# Patient Record
Sex: FEMALE | Race: WHITE | NOT HISPANIC OR LATINO | Employment: OTHER | URBAN - METROPOLITAN AREA
[De-identification: names, ages, dates, MRNs, and addresses within clinical notes are randomized per-mention and may not be internally consistent; named-entity substitution may affect disease eponyms.]

---

## 2017-10-04 ENCOUNTER — TELEPHONE (OUTPATIENT)
Dept: FAMILY MEDICINE | Facility: CLINIC | Age: 47
End: 2017-10-04

## 2017-10-04 NOTE — TELEPHONE ENCOUNTER
Panel Management Review      Patient has the following on her problem list: None      Composite cancer screening  Chart review shows that this patient is due/due soon for the following Pap Smear and Mammogram  Summary:    Patient is due/failing the following:   MAMMOGRAM and PAP    Action needed:   Patient needs office visit for annual physical and mammogram.    Type of outreach:    Sent letter.    Questions for provider review:    None                                                                                                                                    Kaitlynn Gaytan CMA ([facility])       Chart routed to.

## 2017-10-04 NOTE — LETTER
October 4, 2017      Carly Beckwith  [address] [place] [address]          Cintia Pacheco NP has been reviewing your chart.  It appears that there are aspects of your care that could be improved.  At this time you are due for an Annual Physical and Mammogram.  If you could plan to do that in the near future it would be very helpful.  We are trying to help our patients achieve their health care goals.      If you have any questions and to schedule this appointment please contact the clinic at [phone].       Thank you,            Sincerely,        Cintia Pacheco NP

## 2017-10-22 ENCOUNTER — HEALTH MAINTENANCE LETTER (OUTPATIENT)
Age: 47
End: 2017-10-22

## 2017-10-26 DIAGNOSIS — F32.89 POSTMENOPAUSAL DEPRESSION: ICD-10-CM

## 2017-10-26 DIAGNOSIS — F32.1 MAJOR DEPRESSIVE DISORDER, SINGLE EPISODE, MODERATE (H): ICD-10-CM

## 2017-10-31 RX ORDER — CITALOPRAM HYDROBROMIDE 20 MG/1
TABLET ORAL
Qty: 30 TABLET | Refills: 0 | Status: SHIPPED | OUTPATIENT
Start: 2017-10-31 | End: 2017-11-28

## 2017-10-31 RX ORDER — BUPROPION HYDROCHLORIDE 150 MG/1
TABLET ORAL
Qty: 30 TABLET | Refills: 0 | Status: SHIPPED | OUTPATIENT
Start: 2017-10-31 | End: 2017-11-28

## 2017-11-21 ENCOUNTER — HOSPITAL ENCOUNTER (OUTPATIENT)
Dept: MAMMOGRAPHY | Facility: CLINIC | Age: 47
Discharge: HOME | End: 2017-11-21
Attending: NURSE PRACTITIONER | Admitting: NURSE PRACTITIONER
Payer: COMMERCIAL

## 2017-11-21 DIAGNOSIS — Z12.31 ENCOUNTER FOR SCREENING MAMMOGRAM FOR BREAST CANCER: ICD-10-CM

## 2017-11-21 PROCEDURE — G0202 SCR MAMMO BI INCL CAD: HCPCS

## 2017-11-28 ENCOUNTER — OFFICE VISIT (OUTPATIENT)
Dept: FAMILY MEDICINE | Facility: CLINIC | Age: 47
End: 2017-11-28
Payer: COMMERCIAL

## 2017-11-28 VITALS
TEMPERATURE: 97.7 F | HEIGHT: 66 IN | WEIGHT: 177 LBS | HEART RATE: 75 BPM | BODY MASS INDEX: 28.45 KG/M2 | SYSTOLIC BLOOD PRESSURE: 109 MMHG | DIASTOLIC BLOOD PRESSURE: 63 MMHG

## 2017-11-28 DIAGNOSIS — N95.2 VAGINAL ATROPHY: ICD-10-CM

## 2017-11-28 DIAGNOSIS — Z00.00 ENCOUNTER FOR ROUTINE ADULT HEALTH EXAMINATION WITHOUT ABNORMAL FINDINGS: Primary | ICD-10-CM

## 2017-11-28 DIAGNOSIS — Z13.6 CARDIOVASCULAR SCREENING; LDL GOAL LESS THAN 160: ICD-10-CM

## 2017-11-28 DIAGNOSIS — F32.89 POSTMENOPAUSAL DEPRESSION: ICD-10-CM

## 2017-11-28 DIAGNOSIS — Z12.4 SCREENING FOR MALIGNANT NEOPLASM OF CERVIX: ICD-10-CM

## 2017-11-28 DIAGNOSIS — F32.1 MAJOR DEPRESSIVE DISORDER, SINGLE EPISODE, MODERATE (H): ICD-10-CM

## 2017-11-28 DIAGNOSIS — Z23 NEED FOR PROPHYLACTIC VACCINATION AND INOCULATION AGAINST INFLUENZA: ICD-10-CM

## 2017-11-28 LAB
CHOLEST SERPL-MCNC: 245 MG/DL
GLUCOSE SERPL-MCNC: 72 MG/DL (ref 70–99)
HDLC SERPL-MCNC: 92 MG/DL
LDLC SERPL CALC-MCNC: 142 MG/DL
NONHDLC SERPL-MCNC: 153 MG/DL
TRIGL SERPL-MCNC: 53 MG/DL

## 2017-11-28 PROCEDURE — 87624 HPV HI-RISK TYP POOLED RSLT: CPT | Performed by: NURSE PRACTITIONER

## 2017-11-28 PROCEDURE — 36415 COLL VENOUS BLD VENIPUNCTURE: CPT | Performed by: NURSE PRACTITIONER

## 2017-11-28 PROCEDURE — 90686 IIV4 VACC NO PRSV 0.5 ML IM: CPT | Performed by: NURSE PRACTITIONER

## 2017-11-28 PROCEDURE — 99396 PREV VISIT EST AGE 40-64: CPT | Performed by: NURSE PRACTITIONER

## 2017-11-28 PROCEDURE — 90471 IMMUNIZATION ADMIN: CPT | Performed by: NURSE PRACTITIONER

## 2017-11-28 PROCEDURE — 80061 LIPID PANEL: CPT | Performed by: NURSE PRACTITIONER

## 2017-11-28 PROCEDURE — G0145 SCR C/V CYTO,THINLAYER,RESCR: HCPCS | Performed by: NURSE PRACTITIONER

## 2017-11-28 PROCEDURE — 82947 ASSAY GLUCOSE BLOOD QUANT: CPT | Performed by: NURSE PRACTITIONER

## 2017-11-28 RX ORDER — BUPROPION HYDROCHLORIDE 150 MG/1
150 TABLET ORAL EVERY MORNING
Qty: 90 TABLET | Refills: 3 | Status: SHIPPED | OUTPATIENT
Start: 2017-11-28 | End: 2018-12-21

## 2017-11-28 RX ORDER — CITALOPRAM HYDROBROMIDE 20 MG/1
20 TABLET ORAL DAILY
Qty: 90 TABLET | Refills: 3 | Status: SHIPPED | OUTPATIENT
Start: 2017-11-28 | End: 2018-12-21

## 2017-11-28 ASSESSMENT — ANXIETY QUESTIONNAIRES
IF YOU CHECKED OFF ANY PROBLEMS ON THIS QUESTIONNAIRE, HOW DIFFICULT HAVE THESE PROBLEMS MADE IT FOR YOU TO DO YOUR WORK, TAKE CARE OF THINGS AT HOME, OR GET ALONG WITH OTHER PEOPLE: NOT DIFFICULT AT ALL
2. NOT BEING ABLE TO STOP OR CONTROL WORRYING: NOT AT ALL
7. FEELING AFRAID AS IF SOMETHING AWFUL MIGHT HAPPEN: NOT AT ALL
GAD7 TOTAL SCORE: 0
1. FEELING NERVOUS, ANXIOUS, OR ON EDGE: NOT AT ALL
3. WORRYING TOO MUCH ABOUT DIFFERENT THINGS: NOT AT ALL
6. BECOMING EASILY ANNOYED OR IRRITABLE: NOT AT ALL
5. BEING SO RESTLESS THAT IT IS HARD TO SIT STILL: NOT AT ALL

## 2017-11-28 ASSESSMENT — PATIENT HEALTH QUESTIONNAIRE - PHQ9
5. POOR APPETITE OR OVEREATING: NOT AT ALL
SUM OF ALL RESPONSES TO PHQ QUESTIONS 1-9: 3

## 2017-11-28 NOTE — PATIENT INSTRUCTIONS
Preventive Health Recommendations  Female Ages 40 to 49    Yearly exam:     See your health care provider every year in order to  1. Review health changes.   2. Discuss preventive care.    3. Review your medicines if your doctor prescribed any.      Get a Pap test every three years (unless you have an abnormal result and your provider advises testing more often).      If you get Pap tests with HPV test, you only need to test every 5 years, unless you have an abnormal result. You do not need a Pap test if your uterus was removed (hysterectomy) and you have not had cancer.      You should be tested each year for STDs (sexually transmitted diseases), if you're at risk.       Ask your doctor if you should have a mammogram.      Have a colonoscopy (test for colon cancer) if someone in your family has had colon cancer or polyps before age 50.       Have a cholesterol test every 5 years.       Have a diabetes test (fasting glucose) after age 45. If you are at risk for diabetes, you should have this test every 3 years.    Shots: Get a flu shot each year. Get a tetanus shot every 10 years.     Nutrition:     Eat at least 5 servings of fruits and vegetables each day.    Eat whole-grain bread, whole-wheat pasta and brown rice instead of white grains and rice.    Talk to your provider about Calcium and Vitamin D.     Lifestyle    Exercise at least 150 minutes a week (an average of 30 minutes a day, 5 days a week). This will help you control your weight and prevent disease.    Limit alcohol to one drink per day.    No smoking.     Wear sunscreen to prevent skin cancer.    See your dentist every six months for an exam and cleaning.    Re-ordered Premarin cream - 1.5 gm three times weekly for the first month then reduce down to 1 gm 2-3 X weekly for the next 4-5 months, then weekly after that.        AMBROCIO Reynolds

## 2017-11-28 NOTE — MR AVS SNAPSHOT
After Visit Summary   11/28/2017    Carly Beckwith    MRN: [id number]           Patient Information     Date Of Birth          1970        Visit Information        Provider Department      11/28/2017 10:20 AM Cintia Pacheco NP [facility]        Today's Diagnoses     Encounter for routine adult health examination without abnormal findings    -  1    Major depressive disorder, single episode, moderate (H)        Postmenopausal depression        Screening for malignant neoplasm of cervix        CARDIOVASCULAR SCREENING; LDL GOAL LESS THAN 160        Vaginal atrophy          Care Instructions      Preventive Health Recommendations  Female Ages 40 to 49    Yearly exam:     See your health care provider every year in order to  1. Review health changes.   2. Discuss preventive care.    3. Review your medicines if your doctor prescribed any.      Get a Pap test every three years (unless you have an abnormal result and your provider advises testing more often).      If you get Pap tests with HPV test, you only need to test every 5 years, unless you have an abnormal result. You do not need a Pap test if your uterus was removed (hysterectomy) and you have not had cancer.      You should be tested each year for STDs (sexually transmitted diseases), if you're at risk.       Ask your doctor if you should have a mammogram.      Have a colonoscopy (test for colon cancer) if someone in your family has had colon cancer or polyps before age 50.       Have a cholesterol test every 5 years.       Have a diabetes test (fasting glucose) after age 45. If you are at risk for diabetes, you should have this test every 3 years.    Shots: Get a flu shot each year. Get a tetanus shot every 10 years.     Nutrition:     Eat at least 5 servings of fruits and vegetables each day.    Eat whole-grain bread, whole-wheat pasta and brown rice instead of white grains and rice.    Talk to your provider about Calcium  "and Vitamin D.     Lifestyle    Exercise at least 150 minutes a week (an average of 30 minutes a day, 5 days a week). This will help you control your weight and prevent disease.    Limit alcohol to one drink per day.    No smoking.     Wear sunscreen to prevent skin cancer.    See your dentist every six months for an exam and cleaning.    Re-ordered Premarin cream - 1.5 gm three times weekly for the first month then reduce down to 1 gm 2-3 X weekly for the next 4-5 months, then weekly after that.        AMBROCIO Reynolds            Follow-ups after your visit        Who to contact     If you have questions or need follow up information about today's clinic visit or your schedule please contact [facility] directly at [phone].  Normal or non-critical lab and imaging results will be communicated to you by MyChart, letter or phone within 4 business days after the clinic has received the results. If you do not hear from us within 7 days, please contact the clinic through MyChart or phone. If you have a critical or abnormal lab result, we will notify you by phone as soon as possible.  Submit refill requests through [employer] or call your pharmacy and they will forward the refill request to us. Please allow 3 business days for your refill to be completed.          Additional Information About Your Visit        [employer] Information     [employer] lets you send messages to your doctor, view your test results, renew your prescriptions, schedule appointments and more. To sign up, go to [email or web address] . Click on \"Log in\" on the left side of the screen, which will take you to the Welcome page. Then click on \"Sign up Now\" on the right side of the page.     You will be asked to enter the access code listed below, as well as some personal information. Please follow the directions to create your username and password.     Your access code is: [email or web address]  Expires: 2/26/2018 11:07 AM     Your access code " "will  in 90 days. If you need help or a new code, please call your [facility] clinic or [phone].        Care EveryWhere ID     This is your Care EveryWhere ID. This could be used by other organizations to access your [facility] medical records  [id number]        Your Vitals Were     Pulse Temperature Height Last Period Breastfeeding? BMI (Body Mass Index)    75 97.7  F (36.5  C) (Tympanic) 5' 6.25\" (1.683 m) 2011 No 28.35 kg/m2       Blood Pressure from Last 3 Encounters:   17 109/63   10/05/16 107/68   05/26/15 119/71    Weight from Last 3 Encounters:   17 177 lb (80.3 kg)   10/05/16 176 lb 14.4 oz (80.2 kg)   05/26/15 186 lb (84.4 kg)              We Performed the Following     DEPRESSION ACTION PLAN (DAP)     GLUCOSE     HPV High Risk Types DNA Cervical     Lipid panel reflex to direct LDL Non-fasting     Pap imaged thin layer screen with HPV - recommended age 30 - 65 years (select HPV order below)          Today's Medication Changes          These changes are accurate as of: 17 11:15 AM.  If you have any questions, ask your nurse or doctor.               Start taking these medicines.        Dose/Directions    conjugated estrogens cream   Commonly known as:  PREMARIN   Used for:  Postmenopausal depression, Vaginal atrophy   Started by:  Cintia Pacheco NP        Dose:  1.5 g   Start taking on:  2017   Place 1.5 g vaginally three times a week   Quantity:  30 g   Refills:  6         These medicines have changed or have updated prescriptions.        Dose/Directions    buPROPion 150 MG 24 hr tablet   Commonly known as:  WELLBUTRIN XL   This may have changed:  See the new instructions.   Used for:  Major depressive disorder, single episode, moderate (H)   Changed by:  Cintia Pacheco NP        Dose:  150 mg   Take 1 tablet (150 mg) by mouth every morning   Quantity:  90 tablet   Refills:  3       citalopram 20 MG tablet   Commonly known as:  celeXA   This may have " changed:  See the new instructions.   Used for:  Postmenopausal depression   Changed by:  Cintia Pacheco NP        Dose:  20 mg   Take 1 tablet (20 mg) by mouth daily   Quantity:  90 tablet   Refills:  3            Where to get your medicines      These medications were sent to [facility] [address] [place] [address] & [address] [address]    Hours:  24-hours Phone:  [phone]     buPROPion 150 MG 24 hr tablet    citalopram 20 MG tablet    conjugated estrogens cream                Primary Care Provider Office Phone # Fax #    Cintia Pacheco [phone] [address] [address]        Equal Access to Services     MILDRED CARBALLO : Hadii jony hudson hadasho Soomaali, waaxda luqadaha, qaybta kaalmada adeegyada, roseann mata . So [facility] [phone].    ATENCIÓN: Si habla español, tiene a dias disposición servicios gratuitos de asistencia lingüística. [facility] [phone].    We comply with applicable federal civil rights laws and [place] laws. We do not discriminate on the basis of race, color, national origin, age, disability, sex, sexual orientation, or gender identity.            Thank you!     Thank you for choosing [facility]  for your care. Our goal is always to provide you with excellent care. Hearing back from our patients is one way we can continue to improve our services. Please take a few minutes to complete the written survey that you may receive in the mail after your visit with us. Thank you!             Your Updated Medication List - Protect others around you: Learn how to safely use, store and throw away your medicines at www.disposemymeds.org.          This list is accurate as of: 11/28/17 11:15 AM.  Always use your most recent med list.                   Brand Name Dispense Instructions for use Diagnosis    buPROPion 150 MG 24 hr tablet     WELLBUTRIN XL    90 tablet    Take 1 tablet (150 mg) by mouth every morning    Major depressive disorder, single episode, moderate (H)       citalopram 20 MG tablet    celeXA    90 tablet    Take 1 tablet (20 mg) by mouth daily    Postmenopausal depression       conjugated estrogens cream   Start taking on:  11/29/2017    PREMARIN    30 g    Place 1.5 g vaginally three times a week    Postmenopausal depression, Vaginal atrophy       estradiol 0.0375 MG/24HR BIW patch    VIVELLE-DOT    8 patch    Place 1 patch onto the skin twice a week

## 2017-11-28 NOTE — NURSING NOTE
"Initial /63  Pulse 75  Temp 97.7  F (36.5  C) (Tympanic)  Ht 5' 6.25\" (1.683 m)  Wt 177 lb (80.3 kg)  LMP 06/21/2011  Breastfeeding? No  BMI 28.35 kg/m2 Estimated body mass index is 28.35 kg/(m^2) as calculated from the following:    Height as of this encounter: 5' 6.25\" (1.683 m).    Weight as of this encounter: 177 lb (80.3 kg). .    Kaitlynn Gaytan CMA ([facility])  "

## 2017-11-28 NOTE — LETTER
"[facility]  [address]  [facility] [address]  Phone: [phone]    November 28, 2017        Carly Beckwith  [address] [place] [address]          Dear Carly,      Cholesterol overall looks pretty good!     Your total cholesterol should be less than 180.     HDL is the \"good\" cholesterol and when it is high (over 60), it decreases the risk for heart attack and stroke. When HDL is less than 40, it raises the risk for these problems. You can raise the HDL by eating fish and by performing regular aerobic exercise (e.g. running, biking, swimming, aerobics).     LDL is the \"bad\" cholesterol linked to heart disease and stroke. For those who have heart disease or diabetes, their LDL should be less than 100. For all others, the LDL should be less than 130. You can lower this by following a low fat and low cholesterol diet.     Your triglycerides should be less than 160. You can lower these with a low fat diet, and for those with diabetes, by improving blood sugar control.     The ratio of your total cholesterol to good cholesterol (HDL) should be less than 5. Ratios above 5 have been associated with an increased risk of heart attack and stroke in the following 10 years. Raising your HDL cholesterol (see above) can dramatically improve this ratio.     Component      Latest Ref Rng & Units 11/28/2017   Cholesterol      <200 mg/dL 245 (H)   Triglycerides      <150 mg/dL 53   HDL Cholesterol      >49 mg/dL 92   LDL Cholesterol Calculated      <100 mg/dL 142 (H)   Non HDL Cholesterol      <130 mg/dL 153 (H)   Glucose      70 - 99 mg/dL 72       Sincerely,        Cintia Pacheco, AMBROCIO / logan cma        "
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
## 2017-11-28 NOTE — LETTER
December 6, 2017    Carly Beckwith  [address] [place] [address]    Dear Carly,  We are happy to inform you that your PAP smear result from 11/28/17 is normal.  We are now able to do a follow up test on PAP smears. The DNA test is for HPV (Human Papilloma Virus). Cervical cancer is closely linked with certain types of HPV. Your result showed no evidence of high risk HPV.  Therefore we recommend you return in 3 years for your next pap smear and HPV test.  You will still need to return to the clinic every year for an annual exam and other preventive tests.  Please contact the clinic at [phone] with any questions.  Sincerely,    Cintia Pacheco NP/olga

## 2017-11-28 NOTE — PROGRESS NOTES

## 2017-11-29 ASSESSMENT — ANXIETY QUESTIONNAIRES: GAD7 TOTAL SCORE: 0

## 2017-11-30 LAB
COPATH REPORT: NORMAL
PAP: NORMAL

## 2017-12-04 LAB
FINAL DIAGNOSIS: NORMAL
HPV HR 12 DNA CVX QL NAA+PROBE: NEGATIVE
HPV16 DNA SPEC QL NAA+PROBE: NEGATIVE
HPV18 DNA SPEC QL NAA+PROBE: NEGATIVE
SPECIMEN DESCRIPTION: NORMAL

## 2018-06-01 DIAGNOSIS — F32.1 MAJOR DEPRESSIVE DISORDER, SINGLE EPISODE, MODERATE (H): ICD-10-CM

## 2018-06-01 DIAGNOSIS — F32.89 POSTMENOPAUSAL DEPRESSION: ICD-10-CM

## 2018-06-01 NOTE — TELEPHONE ENCOUNTER
"Requested Prescriptions   Pending Prescriptions Disp Refills     citalopram (CELEXA) 20 MG tablet [Pharmacy Med Name: CITALOPRAM 20MG TABLETS]  Last Written Prescription Date:  11/28/17  Last Fill Quantity: 90,  # refills: 3   Last office visit: 11/28/2017 with prescribing provider:  11/28/17   Future Office Visit:     90 tablet 0     Sig: TAKE 1 TABLET(20 MG) BY MOUTH DAILY    SSRIs Protocol Failed    6/1/2018  1:51 PM       Failed - PHQ-9 score less than 5 in past 6 months    Please review last PHQ-9 score.   PHQ-9 SCORE 5/26/2015 10/5/2016 11/28/2017   Total Score 5 - -   Total Score - 3 3          Failed - Recent (6 mo) or future (30 days) visit within the authorizing provider's specialty    Patient had office visit in the last 6 months or has a visit in the next 30 days with authorizing provider or within the authorizing provider's specialty.  See \"Patient Info\" tab in inbasket, or \"Choose Columns\" in Meds & Orders section of the refill encounter.           Passed - Medication is Bupropion    If the medication is Bupropion (Wellbutrin), and the patient is taking for smoking cessation; OK to refill.         Passed - Patient is age 18 or older       Passed - No active pregnancy on record       Passed - No positive pregnancy test in last 12 months        buPROPion (WELLBUTRIN XL) 150 MG 24 hr tablet [Pharmacy Med Name: BUPROPION XL 150MG TABLETS (24 H)]  Last Written Prescription Date:  11/28/17  Last Fill Quantity: 90,  # refills: 3   Last office visit: 11/28/2017 with prescribing provider:  11/28/17   Future Office Visit:     90 tablet 0     Sig: TAKE 1 TABLET(150 MG) BY MOUTH EVERY MORNING    SSRIs Protocol Failed    6/1/2018  1:51 PM       Failed - PHQ-9 score less than 5 in past 6 months    Please review last PHQ-9 score.        Failed - Recent (6 mo) or future (30 days) visit within the authorizing provider's specialty    Patient had office visit in the last 6 months or has a visit in the next 30 days with " "authorizing provider or within the authorizing provider's specialty.  See \"Patient Info\" tab in inbasket, or \"Choose Columns\" in Meds & Orders section of the refill encounter.         Passed - Medication is Bupropion    If the medication is Bupropion (Wellbutrin), and the patient is taking for smoking cessation; OK to refill.       Passed - Patient is age 18 or older       Passed - No active pregnancy on record       Passed - No positive pregnancy test in last 12 months          "

## 2018-06-04 RX ORDER — BUPROPION HYDROCHLORIDE 150 MG/1
TABLET ORAL
Qty: 90 TABLET | Refills: 0 | OUTPATIENT
Start: 2018-06-04

## 2018-06-04 RX ORDER — CITALOPRAM HYDROBROMIDE 20 MG/1
TABLET ORAL
Qty: 90 TABLET | Refills: 0 | OUTPATIENT
Start: 2018-06-04

## 2018-12-21 DIAGNOSIS — F32.89 POSTMENOPAUSAL DEPRESSION: ICD-10-CM

## 2018-12-21 DIAGNOSIS — F32.1 MAJOR DEPRESSIVE DISORDER, SINGLE EPISODE, MODERATE (H): ICD-10-CM

## 2018-12-21 RX ORDER — BUPROPION HYDROCHLORIDE 150 MG/1
TABLET ORAL
Qty: 30 TABLET | Refills: 0 | Status: SHIPPED | OUTPATIENT
Start: 2018-12-21 | End: 2019-01-21

## 2018-12-21 RX ORDER — CITALOPRAM HYDROBROMIDE 20 MG/1
TABLET ORAL
Qty: 30 TABLET | Refills: 0 | Status: SHIPPED | OUTPATIENT
Start: 2018-12-21

## 2018-12-21 NOTE — TELEPHONE ENCOUNTER
"Requested Prescriptions   Pending Prescriptions Disp Refills     buPROPion (WELLBUTRIN XL) 150 MG 24 hr tablet [Pharmacy Med Name: BUPROPION XL 150MG TABLETS (24 H)] 150 tablet 0     Sig: TAKE ONE TABLET BY MOUTH EVERY MORNING    SSRIs Protocol Failed - 12/21/2018  2:33 PM       Failed - PHQ-9 score less than 5 in past 6 months    Please review last PHQ-9 score.          Failed - Recent (6 mo) or future (30 days) visit within the authorizing provider's specialty    Patient had office visit in the last 6 months or has a visit in the next 30 days with authorizing provider or within the authorizing provider's specialty.  See \"Patient Info\" tab in inbasket, or \"Choose Columns\" in Meds & Orders section of the refill encounter.           Passed - Medication is Bupropion    If the medication is Bupropion (Wellbutrin), and the patient is taking for smoking cessation; OK to refill.         Passed - Patient is age 18 or older       Passed - No active pregnancy on record       Passed - No positive pregnancy test in last 12 months        citalopram (CELEXA) 20 MG tablet [Pharmacy Med Name: CITALOPRAM 20MG TABLETS] 150 tablet 0     Sig: TAKE ONE TABLET BY MOUTH DAILY    SSRIs Protocol Failed - 12/21/2018  2:33 PM       Failed - PHQ-9 score less than 5 in past 6 months    Please review last PHQ-9 score.          Failed - Recent (6 mo) or future (30 days) visit within the authorizing provider's specialty    Patient had office visit in the last 6 months or has a visit in the next 30 days with authorizing provider or within the authorizing provider's specialty.  See \"Patient Info\" tab in inbasket, or \"Choose Columns\" in Meds & Orders section of the refill encounter.           Passed - Medication is Bupropion    If the medication is Bupropion (Wellbutrin), and the patient is taking for smoking cessation; OK to refill.         Passed - Patient is age 18 or older       Passed - No active pregnancy on record       Passed - No positive " pregnancy test in last 12 months

## 2018-12-21 NOTE — TELEPHONE ENCOUNTER
Routing refill request to provider for review/approval because:  Patient needs to be seen because it has been more than 1 year since last office visit.  She is due for depression recheck.  She is aware of this and will schedule appt.    Ok for 30 days?  Patient requesting refill today please.    Routing to provider.  Cathy MONTES RN

## 2019-01-21 ENCOUNTER — OFFICE VISIT (OUTPATIENT)
Dept: FAMILY MEDICINE | Facility: CLINIC | Age: 49
End: 2019-01-21
Payer: COMMERCIAL

## 2019-01-21 VITALS
TEMPERATURE: 98.3 F | BODY MASS INDEX: 28.77 KG/M2 | HEART RATE: 61 BPM | OXYGEN SATURATION: 99 % | WEIGHT: 179 LBS | DIASTOLIC BLOOD PRESSURE: 68 MMHG | SYSTOLIC BLOOD PRESSURE: 108 MMHG | HEIGHT: 66 IN

## 2019-01-21 DIAGNOSIS — F32.89 POSTMENOPAUSAL DEPRESSION: ICD-10-CM

## 2019-01-21 DIAGNOSIS — Z12.11 COLON CANCER SCREENING: ICD-10-CM

## 2019-01-21 DIAGNOSIS — F32.1 MAJOR DEPRESSIVE DISORDER, SINGLE EPISODE, MODERATE (H): Primary | ICD-10-CM

## 2019-01-21 PROCEDURE — 99213 OFFICE O/P EST LOW 20 MIN: CPT | Performed by: NURSE PRACTITIONER

## 2019-01-21 RX ORDER — CITALOPRAM HYDROBROMIDE 10 MG/1
10 TABLET ORAL DAILY
Qty: 30 TABLET | Refills: 0 | Status: SHIPPED | OUTPATIENT
Start: 2019-01-21 | End: 2019-03-01

## 2019-01-21 RX ORDER — CITALOPRAM HYDROBROMIDE 20 MG/1
20 TABLET ORAL DAILY
Qty: 30 TABLET | Refills: 0 | Status: CANCELLED | OUTPATIENT
Start: 2019-01-21

## 2019-01-21 RX ORDER — BUPROPION HYDROCHLORIDE 150 MG/1
150 TABLET ORAL EVERY MORNING
Qty: 90 TABLET | Refills: 3 | Status: SHIPPED | OUTPATIENT
Start: 2019-01-21

## 2019-01-21 ASSESSMENT — ANXIETY QUESTIONNAIRES
7. FEELING AFRAID AS IF SOMETHING AWFUL MIGHT HAPPEN: NOT AT ALL
5. BEING SO RESTLESS THAT IT IS HARD TO SIT STILL: SEVERAL DAYS
2. NOT BEING ABLE TO STOP OR CONTROL WORRYING: NOT AT ALL
6. BECOMING EASILY ANNOYED OR IRRITABLE: SEVERAL DAYS
IF YOU CHECKED OFF ANY PROBLEMS ON THIS QUESTIONNAIRE, HOW DIFFICULT HAVE THESE PROBLEMS MADE IT FOR YOU TO DO YOUR WORK, TAKE CARE OF THINGS AT HOME, OR GET ALONG WITH OTHER PEOPLE: NOT DIFFICULT AT ALL
GAD7 TOTAL SCORE: 2
3. WORRYING TOO MUCH ABOUT DIFFERENT THINGS: NOT AT ALL
1. FEELING NERVOUS, ANXIOUS, OR ON EDGE: NOT AT ALL

## 2019-01-21 ASSESSMENT — PATIENT HEALTH QUESTIONNAIRE - PHQ9
SUM OF ALL RESPONSES TO PHQ QUESTIONS 1-9: 4
5. POOR APPETITE OR OVEREATING: NOT AT ALL

## 2019-01-21 ASSESSMENT — MIFFLIN-ST. JEOR: SCORE: 1452.66

## 2019-01-21 NOTE — PATIENT INSTRUCTIONS
Continue Wellbutrin 150 mg once daily.  Decrease citalopram to 10 mg once daily for one month.  If doing well in one month, we can wean off the citalopram.        Thank you for choosing [facility].  You may be receiving a survey in the mail from [employer] regarding your visit today.  Please take a few minutes to complete and return the survey to let us know how we are doing.      If you have questions or concerns, please contact us via [employer] or you can contact your care team at [phone].    Our Clinic hours are:  Monday 6:40 am  to 7:00 pm  Tuesday -Friday 6:40 am to 5:00 pm    The [place] outpatient lab hours are:  Monday - Friday 6:10 am to 4:45 pm  Saturdays 7:00 am to 11:00 am  Appointments are required, call [phone]    If you have clinical questions after hours or would like to schedule an appointment,  call the clinic at [phone].

## 2019-01-21 NOTE — PROGRESS NOTES
"  SUBJECTIVE:   Carly Beckwith is a 48 year old female who presents to clinic today for the following health issues:      Depression Followup    Status since last visit: Stable     Thinks she is in a good spot    Would like to wean off these medications    See PHQ-9 for current symptoms.  Other associated symptoms: None    Complicating factors:   Significant life event:  No   Current substance abuse:  None  Anxiety or Panic symptoms:  No    PHQ 10/5/2016 11/28/2017 1/21/2019   PHQ-9 Total Score 3 3 4   Q9: Suicide Ideation Not at all Not at all Not at all         Amount of exercise or physical activity: None    Problems taking medications regularly: No    Medication side effects: none    Diet: regular (no restrictions)  Trying to watch sugar          Problem list and histories reviewed & adjusted, as indicated.  Additional history: as documented      Reviewed and updated as needed this visit by clinical staff  Tobacco  Allergies  Meds  Problems  Med Hx  Surg Hx  Fam Hx       Reviewed and updated as needed this visit by Provider  Tobacco  Allergies  Meds  Problems  Med Hx  Surg Hx  Fam Hx         ROS:  Constitutional, HEENT, cardiovascular, pulmonary, gi and gu systems are negative, except as otherwise noted.    OBJECTIVE:     /68 (BP Location: Right arm)   Pulse 61   Temp 98.3  F (36.8  C) (Tympanic)   Ht 1.683 m (5' 6.25\")   Wt 81.2 kg (179 lb)   LMP 06/21/2011   SpO2 99%   Breastfeeding? No   BMI 28.67 kg/m    Body mass index is 28.67 kg/m .  GENERAL: healthy, alert and no distress  PSYCH: mentation appears normal, affect normal/bright      ASSESSMENT/PLAN:       ICD-10-CM    1. Major depressive disorder, single episode, moderate (H) F32.1 buPROPion (WELLBUTRIN XL) 150 MG 24 hr tablet     citalopram (CELEXA) 10 MG tablet   2. Postmenopausal depression F32.89    3. Colon cancer screening Z12.11 GASTROENTEROLOGY ADULT REF PROCEDURE ONLY       Patient Instructions   Continue Wellbutrin 150 " mg once daily.  Decrease citalopram to 10 mg once daily for one month.  If doing well in one month, we can wean off the citalopram.        The risks, benefits and treatment options of prescribed medications or other treatments have been discussed with the patient. The patient verbalized their understanding and should call or follow up if no improvement or if they develop further problems.    ANDRESSA Del Angel [facility]

## 2019-01-22 ASSESSMENT — ANXIETY QUESTIONNAIRES: GAD7 TOTAL SCORE: 2

## 2019-03-01 ENCOUNTER — NURSE TRIAGE (OUTPATIENT)
Dept: NURSING | Facility: CLINIC | Age: 49
End: 2019-03-01

## 2019-03-01 DIAGNOSIS — F32.1 MAJOR DEPRESSIVE DISORDER, SINGLE EPISODE, MODERATE (H): ICD-10-CM

## 2019-03-01 RX ORDER — CITALOPRAM HYDROBROMIDE 10 MG/1
TABLET ORAL
Qty: 90 TABLET | Refills: 0 | Status: SHIPPED | OUTPATIENT
Start: 2019-03-01

## 2019-03-01 NOTE — TELEPHONE ENCOUNTER
Patient received a message to call the clinic back. Reviewed the note in patient's chart in Epic:    Patricia Cruz RN          3/1/19 1:04 PM   Note      I have attempted to contact this patient by phone with the following results: left message to return my call on answering machine.  CSS may deliver message below.     Patricia JERONIMO RN                          3/1/19 12:48 PM   Adeola Vaughan APRN CNP routed this conversation to [facility] Provider Adeola Huynh APRN CNP          3/1/19 12:48 PM   Note      Refilled.  She can follow up with me after tax season if needed  Adeola Vaughan CNP        Advised patient that 90 days was refilled for patient. She had no additional questions or concerns.     Kyra Conner RN   [place] Nurse Advisor      Additional Information    [1] Follow-up call to recent contact AND [2] information only call, no triage required    Protocols used: INFORMATION ONLY CALL-ADULT-

## 2019-03-01 NOTE — TELEPHONE ENCOUNTER
I have attempted to contact this patient by phone with the following results: left message to return my call on answering machine.  CSS may deliver message below.    Patricia JERONIMO RN

## 2019-03-01 NOTE — TELEPHONE ENCOUNTER
"Routing refill request to provider for review/approval because:  Please advise,   Called pt, says she eventually wants to \"stop\" the medication however, \"this is tax season for me,\" so she does not want to go off this medication as of yet.   Wants refills for March and April.  Discussed she was to return around 2/18/19 for follow up, needs appt \"I'm definitely not gonna have time to come in.\"  Would you like her schedule a phone visit? She is willing to do that.    Requested Prescriptions   Pending Prescriptions Disp Refills     citalopram (CELEXA) 10 MG tablet [Pharmacy Med Name: CITALOPRAM 10MG TABLETS] 30 tablet 0     Sig: TAKE 1 TABLET(10 MG) BY MOUTH DAILY    SSRIs Protocol Passed - 3/1/2019 10:23 AM       Passed - PHQ-9 score less than 5 in past 6 months    Please review last PHQ-9 score.          Passed - Medication is active on med list       Passed - Patient is age 18 or older       Passed - No active pregnancy on record       Passed - No positive pregnancy test in last 12 months       Passed - Recent (6 mo) or future (30 days) visit within the authorizing provider's specialty    Patient had office visit in the last 6 months or has a visit in the next 30 days with authorizing provider or within the authorizing provider's specialty.  See \"Patient Info\" tab in inbasket, or \"Choose Columns\" in Meds & Orders section of the refill encounter.            Last Written Prescription Date:  1/21/19  Last Fill Quantity: 30,  # refills: 0   Last office visit: 1/21/2019 with Clement ZENDEJAS  Future Office Visit:      Patricia JERONIMO RN      "

## 2019-03-04 NOTE — TELEPHONE ENCOUNTER
Left message for patient to check pharmacy for the medication she requested and to make an appointment for refills if needed.    Mana MONTES Rn

## 2019-07-11 ENCOUNTER — TELEPHONE (OUTPATIENT)
Dept: FAMILY MEDICINE | Facility: CLINIC | Age: 49
End: 2019-07-11

## 2019-07-11 NOTE — LETTER
July 11, 2019        Carly Beckwith  [address] [place] [address]    Dear Carly,    Adeola Vaughan NP has been reviewing your chart.  It appears that there are aspects of your care that could be improved.   At this time you are due for a colonoscopy.    Colon Cancer Screening- Recommended every 5-10 years, depending on your history, in order to prevent and detect colon cancer at its earliest stages.  Colon cancer is now the second leading cause of death in the [place] for both men and women and there are over 130,000 new cases and 50,000 deaths per year from colon cancer.  Colonoscopies can prevent 90-95% of these deaths.  Problem lesions can be removed before they ever become cancer.  This test is not only looking for cancer, but also getting rid of precancerous lesions.  You are usually given some sedation which makes the test very comfortable for most people.     If you do not wish to do a colonoscopy or cannot afford to do one, at this time, there is another option.  It is called a Fit test or Fecal Immunochemical Occult Blood Test (take home stool sample kit).  It does not replace the colonoscopy for colorectal cancer screening, but it can detect hidden bleeding in the lower colon.  It does need to be repeated every year and if a positive result is obtained, you would be referred for a colonoscopy.  The FIT test is really easy to do and does not require any diet or medication restrictions and involves only one collection sample.     If you are under/uninsured, we recommend you contact the [employer]- They provide free/reduced fee screenings to eligible patients based on family size and income.    [place]- [place]'s cancer screening program;  Toll free 1-929.574.5066-/  [phone]  [employer]    If you have completed either one of these tests or had a flexible sigmoidoscopy in the past five years at another facility, please let us know so that we can update your records.  Please call us  ([phone]) if you have questions or would like to arrange either to do a colonoscopy or obtain the necessary test kit for the Fecal Occult Test.

## 2019-07-11 NOTE — TELEPHONE ENCOUNTER
Panel Management Review           Composite cancer screening  Chart review shows that this patient is due/due soon for the following Colonoscopy  Summary:    Patient is due/failing the following:   COLONOSCOPY    Action needed:   Patient needs referral/order: for colonoscopy vs. FIT    Type of outreach:    Sent letter.    Questions for provider review:    None                                                                                                                                    PANCHO LAO